# Patient Record
Sex: MALE | Race: WHITE | NOT HISPANIC OR LATINO | Employment: OTHER | URBAN - METROPOLITAN AREA
[De-identification: names, ages, dates, MRNs, and addresses within clinical notes are randomized per-mention and may not be internally consistent; named-entity substitution may affect disease eponyms.]

---

## 2025-06-11 ENCOUNTER — APPOINTMENT (OUTPATIENT)
Facility: CLINIC | Age: 75
End: 2025-06-11
Payer: MEDICARE

## 2025-06-11 VITALS
WEIGHT: 213 LBS | HEIGHT: 71 IN | BODY MASS INDEX: 29.82 KG/M2 | DIASTOLIC BLOOD PRESSURE: 80 MMHG | OXYGEN SATURATION: 97 % | SYSTOLIC BLOOD PRESSURE: 122 MMHG | HEART RATE: 74 BPM

## 2025-06-11 DIAGNOSIS — J30.9 ALLERGIC RHINITIS, UNSPECIFIED SEASONALITY, UNSPECIFIED TRIGGER: ICD-10-CM

## 2025-06-11 DIAGNOSIS — R42 EPISODE OF DIZZINESS: ICD-10-CM

## 2025-06-11 DIAGNOSIS — K21.9 GASTROESOPHAGEAL REFLUX DISEASE WITHOUT ESOPHAGITIS: ICD-10-CM

## 2025-06-11 DIAGNOSIS — R09.81 SINUS CONGESTION: ICD-10-CM

## 2025-06-11 DIAGNOSIS — L57.0 ACTINIC KERATOSES: ICD-10-CM

## 2025-06-11 DIAGNOSIS — R43.1 PAROSMIA: Primary | ICD-10-CM

## 2025-06-11 PROCEDURE — 1159F MED LIST DOCD IN RCRD: CPT

## 2025-06-11 PROCEDURE — 99214 OFFICE O/P EST MOD 30 MIN: CPT

## 2025-06-11 PROCEDURE — 1036F TOBACCO NON-USER: CPT

## 2025-06-11 PROCEDURE — 3008F BODY MASS INDEX DOCD: CPT

## 2025-06-11 RX ORDER — OMEPRAZOLE 20 MG/1
20 CAPSULE, DELAYED RELEASE ORAL DAILY
Qty: 90 CAPSULE | Refills: 1 | Status: SHIPPED | OUTPATIENT
Start: 2025-06-11

## 2025-06-11 RX ORDER — OMEPRAZOLE 20 MG/1
20 CAPSULE, DELAYED RELEASE ORAL DAILY
Qty: 90 CAPSULE | Refills: 1 | Status: SHIPPED | OUTPATIENT
Start: 2025-06-11 | End: 2025-06-11

## 2025-06-11 RX ORDER — FLUTICASONE PROPIONATE 50 MCG
1 SPRAY, SUSPENSION (ML) NASAL DAILY
Qty: 16 G | Refills: 5 | Status: SHIPPED | OUTPATIENT
Start: 2025-06-11 | End: 2026-06-11

## 2025-06-11 RX ORDER — FLUTICASONE PROPIONATE 50 MCG
1 SPRAY, SUSPENSION (ML) NASAL DAILY
Qty: 16 G | Refills: 5 | Status: SHIPPED | OUTPATIENT
Start: 2025-06-11 | End: 2025-06-11

## 2025-06-11 NOTE — PROGRESS NOTES
I reviewed and examined the patient. I was present for the key exam elements, and I fully participated in the patient's care. I discussed the management of the care with the resident. I have personally reviewed the pertinent labs and imaging, as well as recent notes, with the patient. I have reviewed the note above and agree with the resident's medical decision making as documented in the resident's note, in addition to the following comments / findings:     Agree with the rest of the plan outlined below by resident physician. No red flags.      The patient understands and agrees to the assessment and plan of care. Patient has also agreed to follow up and comply with the treatment and evaluation as recommended today. Patient was instructed to call the office at [phone] should questions arise regarding their treatment or care.     Juan Whiteside DO, FAOASM  Family Medicine   [address], [address]  [place] [address]     Juan Whiteside DO

## 2025-06-11 NOTE — PROGRESS NOTES
Chief Complaint  Patient ID: Chano Alaniz Jr. is a 74 y.o. male who presents for post nasal drips for months and the taste is terrible .    Past Medical, Surgical, and Family History reviewed and updated in chart.    Reviewed all medications by prescribing practitioner or clinical pharmacist (such as prescriptions, OTCs, herbal therapies and supplements) and documented in the medical record.    History of Present Illness  1.  Bad taste  Patient reports a bad taste in the back of the throat that occurs intermittently during the day  It taste like rotten warms in the far back of his mouth  He notes a long-standing history of sinus issues but denies any significant sinus drainage   Saw a dentist this spring and did have some dental work at that time in spring.   He developed an asbcess within the right lower molars but it has since resolved.   Patient does not take any medications and denies any symptoms of acid reflux   Occasionally he experiences periodic episodes of dizziness in the morning and pressure behind his eyes and sinuses that resolves after he blow his nose   He also has a history of tonsil stones in the past     2. Rash   Chano notes some right-sided discoloration that is hairless and pink in color in his left forearm. He notes being exposed to the sun a lot as he spends most of his days outside.  He has had lesions frozen off in the past and is interested in a dermatology referral     Review of Systems  All pertinent positive symptoms are included in the history of present illness.    All other systems have been reviewed and are negative and noncontributory to this patient's current ailments.    Past Medical History  He has a past medical history of Other cervical disc degeneration, unspecified cervical region, Other conditions influencing health status, Other conditions influencing health status, and Other conditions influencing health status.    Surgical History  He has a past surgical history  "that includes Carpal tunnel release (09/27/2012); Appendectomy (09/27/2012); Shoulder surgery (09/27/2012); Knee arthroscopy w/ debridement (09/27/2012); and Other surgical history (11/14/2018).     Social History  He reports that he has never smoked. He has never used smokeless tobacco. No history on file for alcohol use and drug use.    Family History[1]  Medications Prior to Visit[2]    Allergies  Codeine and Keflex [cephalexin]    Immunization History   Administered Date(s) Administered    Moderna SARS-CoV-2 Vaccination 04/20/2021, 05/20/2021     Objective   Visit Vitals  /80   Pulse 74   Ht 1.803 m (5' 11\")   Wt 96.6 kg (213 lb)   SpO2 97%   BMI 29.71 kg/m²   Smoking Status Never   BSA 2.2 m²        BP Readings from Last 3 Encounters:   06/11/25 122/80   07/07/22 122/72   05/12/22 124/68      Wt Readings from Last 3 Encounters:   06/11/25 96.6 kg (213 lb)   07/07/22 101 kg (221 lb 9.6 oz)   05/12/22 98 kg (216 lb)      Relevant Results  No visits with results within 1 Month(s) from this visit.   Latest known visit with results is:   Legacy Encounter on 05/18/2022   Component Date Value    WBC 05/18/2022 6.2     RBC 05/18/2022 4.90     Hemoglobin 05/18/2022 14.3     Hematocrit 05/18/2022 44.4     MCV 05/18/2022 90.6     MCH 05/18/2022 29.2     MCHC 05/18/2022 32.2     RDW-SD 05/18/2022 38.9     RDW-CV 05/18/2022 11.8     Platelets 05/18/2022 197     MPV 05/18/2022 10.0     nRBC 05/18/2022 0     Calcium 05/18/2022 9.7     AST 05/18/2022 23     Alkaline Phosphatase 05/18/2022 91     Total Bilirubin 05/18/2022 0.5     Total Protein 05/18/2022 6.5     Albumin 05/18/2022 4.6     Globulin, Total 05/18/2022 1.9     A/G Ratio 05/18/2022 2.4     Sodium 05/18/2022 142     Potassium 05/18/2022 4.5     Chloride 05/18/2022 105     Bicarbonate 05/18/2022 26     Anion Gap 05/18/2022 11     Urea Nitrogen 05/18/2022 23     Creatinine 05/18/2022 1.1     Urea Nitrogen/Creatinine* 05/18/2022 20.9     Glucose 05/18/2022 109 " (H)     ALT (SGPT) 05/18/2022 28     ESTIMATED GFR 05/18/2022 72     SERUM COLOR 05/18/2022 YELLOW     SERUM CLARITY 05/18/2022 CLEAR     Fasting status 05/18/2022 YES     Cholesterol 05/18/2022 181     Triglycerides 05/18/2022 60     HDL 05/18/2022 53     LDL Calculated 05/18/2022 116     Cholesterol/HDL Ratio 05/18/2022 3.4     PSA 05/18/2022 0.2     Thyroid Stimulating Horm* 05/18/2022 1.90     Vitamin D, 25-Hydroxy 05/18/2022 41      The ASCVD Risk score (Sania DK, et al., 2019) failed to calculate for the following reasons:    Cannot find a previous HDL lab    Cannot find a previous total cholesterol lab    Physical Exam  CONSTITUTIONAL - well nourished, well developed, looks like stated age, in no acute distress, not ill-appearing, and not tired appearing  SKIN - pink/hypopigmented patch of skin in the left anterior forearm   HEAD - no trauma, normocephalic  EYES - pupils are equal and reactive to light, extraocular muscles are intact, and normal external exam  ENT - TM's intact, no injection, no signs of infection, uvula midline, normal tongue movement and throat normal, no exudate, nasal passage without discharge and patent  NECK - supple without rigidity, no neck mass was observed, no thyromegaly or thyroid nodules  CHEST - clear to auscultation, no wheezing, no crackles and no rales, good effort  CARDIAC - regular rate and regular rhythm, no skipped beats, no murmur  EXTREMITIES - no obvious or evident edema, no obvious or evident deformities  NEUROLOGICAL - alert, oriented and no focal signs  PSYCHIATRIC - alert, pleasant and cordial, age-appropriate  IMMUNOLOGIC - no cervical lymphadenopathy    Assessment and Plan  Problem List Items Addressed This Visit    None    Parosmia   Likely related to sinus congestion v acid reflux v tonsil stones  -start omeprazole 20 mg daily  -start flonase nasal spray daily  -CT sinus wo IV contrast  -referred to ENT     2. Rash likely actinic keratosis  -provided  information to follow-up with Dr. Hoffmann   -encouraged continued sun protection          [1] No family history on file.  [2]   No outpatient medications prior to visit.     No facility-administered medications prior to visit.

## 2025-08-03 DIAGNOSIS — R09.81 SINUS CONGESTION: ICD-10-CM

## 2025-08-03 DIAGNOSIS — J30.9 ALLERGIC RHINITIS, UNSPECIFIED SEASONALITY, UNSPECIFIED TRIGGER: ICD-10-CM

## 2025-08-04 RX ORDER — FLUTICASONE PROPIONATE 50 MCG
1 SPRAY, SUSPENSION (ML) NASAL DAILY
Qty: 48 ML | Refills: 2 | Status: SHIPPED | OUTPATIENT
Start: 2025-08-04 | End: 2026-08-04

## 2025-09-12 ENCOUNTER — APPOINTMENT (OUTPATIENT)
Dept: OTOLARYNGOLOGY | Facility: CLINIC | Age: 75
End: 2025-09-12
Payer: MEDICARE